# Patient Record
Sex: MALE | Race: WHITE | HISPANIC OR LATINO | URBAN - METROPOLITAN AREA
[De-identification: names, ages, dates, MRNs, and addresses within clinical notes are randomized per-mention and may not be internally consistent; named-entity substitution may affect disease eponyms.]

---

## 2022-05-30 ENCOUNTER — EMERGENCY (EMERGENCY)
Facility: HOSPITAL | Age: 32
LOS: 1 days | Discharge: HOME | End: 2022-05-30
Attending: EMERGENCY MEDICINE
Payer: COMMERCIAL

## 2022-05-30 VITALS
HEIGHT: 71 IN | WEIGHT: 220.02 LBS | SYSTOLIC BLOOD PRESSURE: 142 MMHG | DIASTOLIC BLOOD PRESSURE: 88 MMHG | OXYGEN SATURATION: 97 % | HEART RATE: 80 BPM | RESPIRATION RATE: 18 BRPM | TEMPERATURE: 98 F

## 2022-05-30 LAB
ALBUMIN SERPL ELPH-MCNC: 4.7 G/DL — SIGNIFICANT CHANGE UP (ref 3.3–5)
ALP SERPL-CCNC: 68 U/L — SIGNIFICANT CHANGE UP (ref 40–120)
ALT FLD-CCNC: 23 U/L — SIGNIFICANT CHANGE UP (ref 10–45)
ANION GAP SERPL CALC-SCNC: 10 MMOL/L — SIGNIFICANT CHANGE UP (ref 5–17)
APPEARANCE UR: CLEAR — SIGNIFICANT CHANGE UP
AST SERPL-CCNC: 22 U/L — SIGNIFICANT CHANGE UP (ref 10–40)
BACTERIA # UR AUTO: NEGATIVE — SIGNIFICANT CHANGE UP
BASE EXCESS BLDV CALC-SCNC: 5.9 MMOL/L — HIGH (ref -2–2)
BASOPHILS # BLD AUTO: 0.04 K/UL — SIGNIFICANT CHANGE UP (ref 0–0.2)
BASOPHILS NFR BLD AUTO: 0.7 % — SIGNIFICANT CHANGE UP (ref 0–2)
BILIRUB SERPL-MCNC: 0.3 MG/DL — SIGNIFICANT CHANGE UP (ref 0.2–1.2)
BILIRUB UR-MCNC: NEGATIVE — SIGNIFICANT CHANGE UP
BUN SERPL-MCNC: 21 MG/DL — SIGNIFICANT CHANGE UP (ref 7–23)
CA-I SERPL-SCNC: 1.26 MMOL/L — SIGNIFICANT CHANGE UP (ref 1.15–1.33)
CALCIUM SERPL-MCNC: 10 MG/DL — SIGNIFICANT CHANGE UP (ref 8.4–10.5)
CHLORIDE BLDV-SCNC: 102 MMOL/L — SIGNIFICANT CHANGE UP (ref 96–108)
CHLORIDE SERPL-SCNC: 103 MMOL/L — SIGNIFICANT CHANGE UP (ref 96–108)
CO2 BLDV-SCNC: 35 MMOL/L — HIGH (ref 22–26)
CO2 SERPL-SCNC: 28 MMOL/L — SIGNIFICANT CHANGE UP (ref 22–31)
COLOR SPEC: SIGNIFICANT CHANGE UP
CREAT SERPL-MCNC: 1.03 MG/DL — SIGNIFICANT CHANGE UP (ref 0.5–1.3)
DIFF PNL FLD: NEGATIVE — SIGNIFICANT CHANGE UP
EGFR: 98 ML/MIN/1.73M2 — SIGNIFICANT CHANGE UP
EOSINOPHIL # BLD AUTO: 0.15 K/UL — SIGNIFICANT CHANGE UP (ref 0–0.5)
EOSINOPHIL NFR BLD AUTO: 2.5 % — SIGNIFICANT CHANGE UP (ref 0–6)
EPI CELLS # UR: 0 /HPF — SIGNIFICANT CHANGE UP
GAS PNL BLDV: 136 MMOL/L — SIGNIFICANT CHANGE UP (ref 136–145)
GAS PNL BLDV: SIGNIFICANT CHANGE UP
GLUCOSE BLDV-MCNC: 113 MG/DL — HIGH (ref 70–99)
GLUCOSE SERPL-MCNC: 114 MG/DL — HIGH (ref 70–99)
GLUCOSE UR QL: NEGATIVE — SIGNIFICANT CHANGE UP
HCO3 BLDV-SCNC: 33 MMOL/L — HIGH (ref 22–29)
HCT VFR BLD CALC: 41.9 % — SIGNIFICANT CHANGE UP (ref 39–50)
HCT VFR BLDA CALC: 44 % — SIGNIFICANT CHANGE UP (ref 39–51)
HGB BLD CALC-MCNC: 14.5 G/DL — SIGNIFICANT CHANGE UP (ref 12.6–17.4)
HGB BLD-MCNC: 14.1 G/DL — SIGNIFICANT CHANGE UP (ref 13–17)
HIV 1 & 2 AB SERPL IA.RAPID: SIGNIFICANT CHANGE UP
HYALINE CASTS # UR AUTO: 1 /LPF — SIGNIFICANT CHANGE UP (ref 0–2)
IMM GRANULOCYTES NFR BLD AUTO: 0.3 % — SIGNIFICANT CHANGE UP (ref 0–1.5)
KETONES UR-MCNC: NEGATIVE — SIGNIFICANT CHANGE UP
LACTATE BLDV-MCNC: 0.8 MMOL/L — SIGNIFICANT CHANGE UP (ref 0.7–2)
LEUKOCYTE ESTERASE UR-ACNC: NEGATIVE — SIGNIFICANT CHANGE UP
LYMPHOCYTES # BLD AUTO: 1.02 K/UL — SIGNIFICANT CHANGE UP (ref 1–3.3)
LYMPHOCYTES # BLD AUTO: 16.7 % — SIGNIFICANT CHANGE UP (ref 13–44)
MCHC RBC-ENTMCNC: 31.3 PG — SIGNIFICANT CHANGE UP (ref 27–34)
MCHC RBC-ENTMCNC: 33.7 GM/DL — SIGNIFICANT CHANGE UP (ref 32–36)
MCV RBC AUTO: 93.1 FL — SIGNIFICANT CHANGE UP (ref 80–100)
MONOCYTES # BLD AUTO: 0.4 K/UL — SIGNIFICANT CHANGE UP (ref 0–0.9)
MONOCYTES NFR BLD AUTO: 6.6 % — SIGNIFICANT CHANGE UP (ref 2–14)
NEUTROPHILS # BLD AUTO: 4.47 K/UL — SIGNIFICANT CHANGE UP (ref 1.8–7.4)
NEUTROPHILS NFR BLD AUTO: 73.2 % — SIGNIFICANT CHANGE UP (ref 43–77)
NITRITE UR-MCNC: NEGATIVE — SIGNIFICANT CHANGE UP
NRBC # BLD: 0 /100 WBCS — SIGNIFICANT CHANGE UP (ref 0–0)
PCO2 BLDV: 57 MMHG — HIGH (ref 42–55)
PH BLDV: 7.37 — SIGNIFICANT CHANGE UP (ref 7.32–7.43)
PH UR: 7.5 — SIGNIFICANT CHANGE UP (ref 5–8)
PLATELET # BLD AUTO: 200 K/UL — SIGNIFICANT CHANGE UP (ref 150–400)
PO2 BLDV: 24 MMHG — LOW (ref 25–45)
POTASSIUM BLDV-SCNC: 4.1 MMOL/L — SIGNIFICANT CHANGE UP (ref 3.5–5.1)
POTASSIUM SERPL-MCNC: 4.1 MMOL/L — SIGNIFICANT CHANGE UP (ref 3.5–5.3)
POTASSIUM SERPL-SCNC: 4.1 MMOL/L — SIGNIFICANT CHANGE UP (ref 3.5–5.3)
PROT SERPL-MCNC: 7.9 G/DL — SIGNIFICANT CHANGE UP (ref 6–8.3)
PROT UR-MCNC: ABNORMAL
RBC # BLD: 4.5 M/UL — SIGNIFICANT CHANGE UP (ref 4.2–5.8)
RBC # FLD: 13.3 % — SIGNIFICANT CHANGE UP (ref 10.3–14.5)
RBC CASTS # UR COMP ASSIST: 0 /HPF — SIGNIFICANT CHANGE UP (ref 0–4)
SAO2 % BLDV: 33.2 % — LOW (ref 67–88)
SODIUM SERPL-SCNC: 141 MMOL/L — SIGNIFICANT CHANGE UP (ref 135–145)
SP GR SPEC: >1.05 (ref 1.01–1.02)
UROBILINOGEN FLD QL: NEGATIVE — SIGNIFICANT CHANGE UP
WBC # BLD: 6.1 K/UL — SIGNIFICANT CHANGE UP (ref 3.8–10.5)
WBC # FLD AUTO: 6.1 K/UL — SIGNIFICANT CHANGE UP (ref 3.8–10.5)
WBC UR QL: 0 /HPF — SIGNIFICANT CHANGE UP (ref 0–5)

## 2022-05-30 PROCEDURE — 72193 CT PELVIS W/DYE: CPT | Mod: 26,MA

## 2022-05-30 PROCEDURE — 99284 EMERGENCY DEPT VISIT MOD MDM: CPT

## 2022-05-30 RX ORDER — KETOROLAC TROMETHAMINE 30 MG/ML
15 SYRINGE (ML) INJECTION ONCE
Refills: 0 | Status: DISCONTINUED | OUTPATIENT
Start: 2022-05-30 | End: 2022-05-30

## 2022-05-30 RX ORDER — SODIUM CHLORIDE 9 MG/ML
1000 INJECTION INTRAMUSCULAR; INTRAVENOUS; SUBCUTANEOUS ONCE
Refills: 0 | Status: COMPLETED | OUTPATIENT
Start: 2022-05-30 | End: 2022-05-30

## 2022-05-30 RX ORDER — ACETAMINOPHEN 500 MG
1000 TABLET ORAL ONCE
Refills: 0 | Status: COMPLETED | OUTPATIENT
Start: 2022-05-30 | End: 2022-05-30

## 2022-05-30 RX ADMIN — Medication 15 MILLIGRAM(S): at 23:50

## 2022-05-30 RX ADMIN — SODIUM CHLORIDE 1000 MILLILITER(S): 9 INJECTION INTRAMUSCULAR; INTRAVENOUS; SUBCUTANEOUS at 19:54

## 2022-05-30 RX ADMIN — Medication 400 MILLIGRAM(S): at 19:54

## 2022-05-30 NOTE — ED PROVIDER NOTE - ADDITIONAL NOTES AND INSTRUCTIONS:
Please excuse Mr. Borja from work on Monday 5/30 and Tuesday 5/31 as he required emergency medical services unrelated to COVID-19.  He is clear to return to work as tolerated on or after Wednesday June 1, 2022.      Thank you in advance for your understanding in this matter,    Dr. Nikki Rosen

## 2022-05-30 NOTE — ED PROVIDER NOTE - PROGRESS NOTE DETAILS
JORDIN Bennett: paged surgery regarding fistula seen on CT Attending MD Rosen.  Pt signed ot to me in stable condition pending Surg2c, dispo per surg, 32 yo male with perianal fistula long-standing now painful and draining x sev'l days, no current drainage, CT c/w fistula, pt afebrile, well appearing, no known hx IBD, known hx HTN only med prob. Attending MD Rosen.  Following signout, colorectal surgery has seen pt and recommends sitza baths, topical analgesics, hydrocortisone cream and NSAIDs with outpt f/u.  Pt educated re: recommendations and counseled to return to Ed for development of fevers/chills/increased pain, development of abdominal pain, difficulty stooling or heavy bleeding.  Pt verbalizes understanding of above return precautions and follow-up plan.

## 2022-05-30 NOTE — ED PROVIDER NOTE - PATIENT PORTAL LINK FT
You can access the FollowMyHealth Patient Portal offered by [facility] by registering at the following website: [email or web address]. By joining [employer]’s FollowMyHealth portal, you will also be able to view your health information using other applications (apps) compatible with our system.

## 2022-05-30 NOTE — ED PROVIDER NOTE - OBJECTIVE STATEMENT
30 y/o male PMHx HTN non compliant with meds now presenting to the ED with rectal pain and draining abscess x2 days. Patient reported the abscess started one month ago growing in size and worsening in pain over last two days. Patient reported abscess drainage is foul smelling and has associated chills but no documented fevers. Patient reports pelvic pressure while trying to void. BMs have been normal and no pain with defecation. Patient has not seen a general surgeon for abscess as of yet. Denied CP, SOB, abdominal pain, N/V/D, fever chills, cough, back pain, dysuria, hematuria, urinary freq. Sexually active with no anal sex. Monogamous and no condoms

## 2022-05-30 NOTE — ED PROVIDER NOTE - NSFOLLOWUPINSTRUCTIONS_ED_ALL_ED_FT
1.  Take tylenol (650 mg by mouth) alternating with motrin (600 mg by mouth) every 3 hours.  (For ex.  Tylenol then 3 hours later motrin then 3 hours later tylenol etc.)  2.  Please take sitz baths.  This means warm water baths in which warm water can reach your anus.  You may use towels or home devices to achieve this or you may opt to purchase a pillow to allow exposure of your anus to the warm water.  3.  Please use topical creams as prescribed for additional comfort.   4.  Please follow-up with Dr. Ly for ongoing management.   5.  Please return to the emergency department for any new or worsening symptoms including the development of worse pain, any fevers, any abdominal pain or any inability to stool.

## 2022-05-30 NOTE — ED PROVIDER NOTE - NS ED ATTENDING STATEMENT MOD
This was a shared visit with the LESLEY. I reviewed and verified the documentation and independently performed the documented:

## 2022-05-30 NOTE — ED PROVIDER NOTE - CARE PROVIDER_API CALL
Reilly Ly)  Surgery  [address] [facility] [address] [facility], [address]  [place] [address]  Phone: [phone]  Fax: [phone]  Follow Up Time:

## 2022-05-30 NOTE — ED ADULT NURSE NOTE - OBJECTIVE STATEMENT
32y/o M coming to the ED c/o rectal abscess. Pt states that hes had the abscess x1mo with worsening pain and size x2days. Pt states it is foul smelling and has pelvic pressure when urinating. Pt denies any pain with BM. Pt denies any CP/SOB/Fever/Chills/N/V/D. On exam, pt is breathing spontaneously, able to speak full sentences w/o difficulty, saturating 98% RA. Abdomen soft, nontender, nondistended. IV placed, labs collected and sent.

## 2022-05-30 NOTE — ED PROVIDER NOTE - ATTENDING APP SHARED VISIT CONTRIBUTION OF CARE
31M hx HTN here with c/o rectal pain and drainage x2 days, also w perineal pressure w urination. No fevers. No hx of STDs. No anal intercourse. No urinary sx. PE with opening at ~1100 o' clock position when lying on stomach, no active drainage no surrounding erythema. no crepitus, no fluctuance, no perineal involvement externally. Will check CT pelvis w IV contrast to eval for rectal/perirectal abscess.

## 2022-05-31 VITALS
SYSTOLIC BLOOD PRESSURE: 130 MMHG | TEMPERATURE: 98 F | DIASTOLIC BLOOD PRESSURE: 82 MMHG | HEART RATE: 56 BPM | RESPIRATION RATE: 18 BRPM | OXYGEN SATURATION: 98 %

## 2022-05-31 PROBLEM — Z00.00 ENCOUNTER FOR PREVENTIVE HEALTH EXAMINATION: Status: ACTIVE | Noted: 2022-05-31

## 2022-05-31 LAB
CULTURE RESULTS: SIGNIFICANT CHANGE UP
SPECIMEN SOURCE: SIGNIFICANT CHANGE UP

## 2022-05-31 PROCEDURE — 36415 COLL VENOUS BLD VENIPUNCTURE: CPT

## 2022-05-31 PROCEDURE — 84295 ASSAY OF SERUM SODIUM: CPT

## 2022-05-31 PROCEDURE — 82435 ASSAY OF BLOOD CHLORIDE: CPT

## 2022-05-31 PROCEDURE — 85018 HEMOGLOBIN: CPT

## 2022-05-31 PROCEDURE — 82330 ASSAY OF CALCIUM: CPT

## 2022-05-31 PROCEDURE — 87086 URINE CULTURE/COLONY COUNT: CPT

## 2022-05-31 PROCEDURE — 85014 HEMATOCRIT: CPT

## 2022-05-31 PROCEDURE — 86703 HIV-1/HIV-2 1 RESULT ANTBDY: CPT

## 2022-05-31 PROCEDURE — 72193 CT PELVIS W/DYE: CPT | Mod: MA

## 2022-05-31 PROCEDURE — 85025 COMPLETE CBC W/AUTO DIFF WBC: CPT

## 2022-05-31 PROCEDURE — 96374 THER/PROPH/DIAG INJ IV PUSH: CPT | Mod: XU

## 2022-05-31 PROCEDURE — 99284 EMERGENCY DEPT VISIT MOD MDM: CPT | Mod: 25

## 2022-05-31 PROCEDURE — 81001 URINALYSIS AUTO W/SCOPE: CPT

## 2022-05-31 PROCEDURE — 82947 ASSAY GLUCOSE BLOOD QUANT: CPT

## 2022-05-31 PROCEDURE — 84132 ASSAY OF SERUM POTASSIUM: CPT

## 2022-05-31 PROCEDURE — 82803 BLOOD GASES ANY COMBINATION: CPT

## 2022-05-31 PROCEDURE — 96375 TX/PRO/DX INJ NEW DRUG ADDON: CPT | Mod: XU

## 2022-05-31 PROCEDURE — 80053 COMPREHEN METABOLIC PANEL: CPT

## 2022-05-31 PROCEDURE — 99282 EMERGENCY DEPT VISIT SF MDM: CPT

## 2022-05-31 PROCEDURE — 83605 ASSAY OF LACTIC ACID: CPT

## 2022-05-31 RX ORDER — HYDROCORTISONE 1 %
1 OINTMENT (GRAM) TOPICAL
Qty: 9 | Refills: 0
Start: 2022-05-31 | End: 2022-06-02

## 2022-05-31 RX ORDER — DOCUSATE SODIUM 100 MG
1 CAPSULE ORAL
Qty: 14 | Refills: 0
Start: 2022-05-31 | End: 2022-06-06

## 2022-05-31 RX ORDER — HYDROCORTISONE/PRAMOXINE 2.5 %-1 %
1 CREAM WITH APPLICATOR RECTAL
Qty: 9 | Refills: 0
Start: 2022-05-31 | End: 2022-06-02

## 2022-05-31 RX ORDER — PRAMOXINE HYDROCHLORIDE 150 MG/15G
1 AEROSOL, FOAM RECTAL
Qty: 9 | Refills: 0
Start: 2022-05-31 | End: 2022-06-02

## 2022-05-31 RX ORDER — LIDOCAINE 4 G/100G
1 CREAM TOPICAL
Qty: 9 | Refills: 0
Start: 2022-05-31 | End: 2022-06-02

## 2022-05-31 NOTE — CONSULT NOTE ADULT - ASSESSMENT
31M with PMH of HTN and first episode of perianal fistula without abscess or collection appreciated on CT imaging. Colorectal surgery called to evaluate for surgical management.    Recommendation:  - No acute surgical intervention  - Symptom control with sitz baths, topical analgesics, hydrocortisone cream and OTC NSAIDs  - Would benefit from surgical correction following resolution of acute inflammation  - Outpatient follow up with Dr. Ly for evaluation of fistulotomy possible seton  - Return precautions for signs of developing systemic illness  - Dispo per ED  - Can call with any questions    Discussed with colorectal surgery fellow on behalf of colorectal surgery attending Dr. Aliyah James PGY2  Red Team Surgery [phone]

## 2022-05-31 NOTE — CONSULT NOTE ADULT - ATTENDING COMMENTS
Agree w/ above  Will need outpatient follow-up to discuss possible surgery  Sitz baths in the meantime  Return if febrile or recurrent abscess    Reilly Ly MD  Attending Physician

## 2022-05-31 NOTE — ED ADULT NURSE REASSESSMENT NOTE - NS ED NURSE REASSESS COMMENT FT1
Received report from ATILIO Mark. VSS. Pt has no complaints at this time. Pending surgery to see pt, pt aware. Will continue to monitor.

## 2022-05-31 NOTE — CONSULT NOTE ADULT - SUBJECTIVE AND OBJECTIVE BOX
SURGERY CONSULT NOTE  :::::::::::::::::::::::::::::::::::::::::::::::::::::  JALEN SIMENTAL 31y M  MRN: [id number]  Admit Date: 05-30-22 (1d)  [facility] ED  :::::::::::::::::::::::::::::::::::::::::::::::::::::  Date of Service: 05-31-22 @ 01:40  Requested by: Doctor, Unknown    Reason for consult:     :::::::::::::::::::::::::::::::::::::::::::::::::::::    HPI:    Patient is a 31y male with PMH of HTN presenting with 3 months of progressively growing perianal abscess which began draining two days prior to presentation and associated with acute increase in pain and foil smelling discharge. Refers he has not had symptoms of systemic infection and denies fever, chill or weakness. Referred difficulty with initiating voids and no changes in his bowel habits. Denies every having a colonoscopy.     Denies headache, chills, fever, dizziness, nausea, vomiting, chest pain, SOB, diarrhea, pain during urination or recent falls.    In the ED patient afebrile and hemodynamically stable. Laboratory results showing WBC: 6.10, Hgb/Hct: 14.1/41.9. Initial imaging:  No perianal or perirectal collection. Linear tract-like soft tissue in the left perianal soft tissues extending into the ischio rectal fat which could represent a fistula.    Allergies: No Known Allergies    Past Medical History:   HTN    Past Surgical History:  None    Family History:  Denies heritable disease including inflammatory bowel disease    Social History:   Denies toxic habits    Home Medications:      ::::::::::::::::::::::::::::::::::::::::::::::::::::::::::::::::::::::::::::::::::::::::::::::::::::::::::::::::::::::::::::::::::::::::::    Vital signs:  T(C): 36.5, Max: 36.6 (05-30 @ 21:05)  HR: 56 (56 - 80)  BP: 130/82 (130/82 - 142/88)  BP(mean): --  ABP: --  RR: 18 (18 - 18)  SpO2: 98% (97% - 98%)  Height (cm): 180.3  Weight (kg): 99.8  BMI (kg/m2): 30.7      Physical Exam:   General: NAD, male appearing stated age  Neuro: Awake, alert and oriented  HEENT: NC, AT, MOM  Resp: Unlabored breathing, symmetric chest expansion  GI/Abd: Soft, non-distended, non-tender  Rectal: Left posterior fistula opening without surrounding erythema or drainable discharge, tender to palpation, rectal tone present, no bloody or purulent discharge, prostate appropriately firm with no palpable fluctuance throughout, no blood in rectal vault.   Musculoskeletal: All 4 extremities moving spontaneously and without deformity  Skin: Warm, dry, no rash    ::::::::::::::::::::::::::::::::::::::::::::::::::::::::::::::::::::::::::::::::::::::::::::::::::::::::::::::::::::::::::::::::::::::::::    Laboratory:                        14.1   6.10  )-----------( 200      ( 05-30 @ 20:07 )             41.9     05-30                             Phos: xx Mg: xx  141  |  103  |  21  ----------------------------<  114  4.1   |  28  |  1.03          05-30   TPro 7.9 / Alb 4.7 / TBili 0.3 / DBili x  / AST/AST 22/23 / AlkPhos 68      Urinalysis Basic - ( 30 May 2022 21:15 )    Color: Light Yellow / Appearance: Clear / SG: >1.050 / pH: x  Gluc: x / Ketone: Negative  / Bili: Negative / Urobili: Negative   Blood: x / Protein: 30 mg/dL / Nitrite: Negative   Leuk Esterase: Negative / RBC: 0 /hpf / WBC 0 /HPF   Sq Epi: x / Non Sq Epi: 0 /hpf / Bacteria: Negative        ::::::::::::::::::::::::::::::::::::::::::::::::::::::::::::::::::::::::::::::::::::::::::::::::::::::::::::::::::::::::::::::::::::::::::    Imaging:  FINDINGS:  BLADDER: Within normal limits.  REPRODUCTIVE ORGANS: Prostate within normal limits.  LYMPH NODES: Prominent subcentimeter bilateral inguinal lymph nodes.   Shape of the lymph nodes are within normal limits, with normal fatty   hilum.    VISUALIZED PORTIONS:  ABDOMINAL ORGANS: Within normal limits.  BOWEL: Within normal limits. Appendix is normal.  PERITONEUM: No ascites or pneumoperitoneum.  VESSELS: Within normal limits.  ABDOMINAL WALL: No perianal collection. Linear tract-like soft tissue in  the left perianal soft tissues extending into the ischio rectal fat.  BONES: Degenerative changes of the lower lumbar spine.    IMPRESSION:  No perianal or perirectal collection. Linear tract-like soft tissue in   the left perianal soft tissues extending into the ischio rectal fat which   could represent a fistula.      ::::::::::::::::::::::::::::::::::::::::::::::::::::::::::::::::::::::::::::::::::::::::::::::::::::::::::::::::::::::::::::::::::::::::::

## 2022-06-14 ENCOUNTER — APPOINTMENT (OUTPATIENT)
Dept: SURGERY | Facility: CLINIC | Age: 32
End: 2022-06-14